# Patient Record
Sex: FEMALE | Race: BLACK OR AFRICAN AMERICAN | Employment: FULL TIME | URBAN - NONMETROPOLITAN AREA
[De-identification: names, ages, dates, MRNs, and addresses within clinical notes are randomized per-mention and may not be internally consistent; named-entity substitution may affect disease eponyms.]

---

## 2019-11-27 LAB — PAP SMEAR, EXTERNAL: NORMAL

## 2020-09-02 RX ORDER — HYOSCYAMINE SULFATE 0.38 MG/1
TABLET, EXTENDED RELEASE ORAL
Qty: 60 TAB | Refills: 0 | Status: SHIPPED | OUTPATIENT
Start: 2020-09-02 | End: 2020-10-02

## 2020-10-02 RX ORDER — HYOSCYAMINE SULFATE 0.38 MG/1
TABLET, EXTENDED RELEASE ORAL
Qty: 60 TAB | Refills: 0 | Status: SHIPPED | OUTPATIENT
Start: 2020-10-02 | End: 2020-11-02

## 2020-11-02 RX ORDER — BUPROPION HYDROCHLORIDE 100 MG/1
TABLET, EXTENDED RELEASE ORAL
Qty: 180 TAB | Refills: 0 | Status: SHIPPED | OUTPATIENT
Start: 2020-11-02 | End: 2021-02-02

## 2020-11-02 RX ORDER — HYOSCYAMINE SULFATE 0.38 MG/1
TABLET, EXTENDED RELEASE ORAL
Qty: 60 TAB | Refills: 0 | Status: SHIPPED | OUTPATIENT
Start: 2020-11-02 | End: 2020-12-03

## 2020-12-03 RX ORDER — HYOSCYAMINE SULFATE 0.38 MG/1
TABLET, EXTENDED RELEASE ORAL
Qty: 60 TAB | Refills: 0 | Status: SHIPPED | OUTPATIENT
Start: 2020-12-03 | End: 2021-01-04

## 2021-01-04 RX ORDER — HYOSCYAMINE SULFATE 0.38 MG/1
TABLET, EXTENDED RELEASE ORAL
Qty: 60 TAB | Refills: 0 | Status: SHIPPED | OUTPATIENT
Start: 2021-01-04 | End: 2021-02-06

## 2021-01-12 ENCOUNTER — OFFICE VISIT (OUTPATIENT)
Dept: FAMILY MEDICINE CLINIC | Age: 31
End: 2021-01-12
Payer: COMMERCIAL

## 2021-01-12 VITALS
OXYGEN SATURATION: 20 % | RESPIRATION RATE: 20 BRPM | TEMPERATURE: 98 F | HEART RATE: 123 BPM | DIASTOLIC BLOOD PRESSURE: 91 MMHG | SYSTOLIC BLOOD PRESSURE: 138 MMHG

## 2021-01-12 DIAGNOSIS — Z20.822 SUSPECTED COVID-19 VIRUS INFECTION: ICD-10-CM

## 2021-01-12 DIAGNOSIS — R43.0 ANOSMIA: ICD-10-CM

## 2021-01-12 DIAGNOSIS — Z20.822 SUSPECTED COVID-19 VIRUS INFECTION: Primary | ICD-10-CM

## 2021-01-12 PROCEDURE — 99213 OFFICE O/P EST LOW 20 MIN: CPT | Performed by: INTERNAL MEDICINE

## 2021-01-12 RX ORDER — NORETHINDRONE ACETATE AND ETHINYL ESTRADIOL AND FERROUS FUMARATE 1MG-20(24)
KIT ORAL
COMMUNITY
Start: 2020-12-17

## 2021-01-12 NOTE — PROGRESS NOTES
Dontrell Travis presents today for   Chief Complaint   Patient presents with    Nasal Congestion     loss of taste and smell    Nasal Congestion     runny nose, sinus congestion       Is someone accompanying this pt? no  Is the patient using any DME equipment during OV? no    Depression Screening:  3 most recent PHQ Screens 1/12/2021   Little interest or pleasure in doing things Not at all   Feeling down, depressed, irritable, or hopeless Not at all   Total Score PHQ 2 0       Learning Assessment:  No flowsheet data found. Health Maintenance reviewed and discussed and ordered per Provider. Health Maintenance Due   Topic Date Due    DTaP/Tdap/Td series (1 - Tdap) 03/19/2011    PAP AKA CERVICAL CYTOLOGY  03/19/2011    Flu Vaccine (1) 09/01/2020   . Coordination of Care:  1. Have you been to the ER, urgent care clinic since your last visit? Hospitalized since your last visit? no    2. Have you seen or consulted any other health care providers outside of the [address] [place] [address] since your last visit? Include any pap smears or colon screening.  no

## 2021-01-12 NOTE — PROGRESS NOTES
This is a very pleasant patient who was seen in clinic today for an acute visit. Assessment/Plan:      1. Suspected COVID-19 virus infection  Based on the loss of smell and taste as well as upper respiratory symptoms I have a strong suspicion she has COVID-19. Will obtain Covid PCR. Patient has been confined to her house for 10+1 days to start tomorrow. Additionally she was advised to seek immediate medical care should she develop any acute chest pain chest pressure or shortness of breath  - NOVEL CORONAVIRUS (COVID-19); Future    2. Anosmia  Due to likely due to COVID-19  - NOVEL CORONAVIRUS (COVID-19); Future       Reviewed with him that COVID-19 pandemic is an evolving situation with rapidly changing recommendations & guidelines. Medical decisions are made based on the the best information available at the time. Recommended he stay tuned for updates published by trusted sources and to advise your PCP of any unexpected changes in clinical condition       Chief Complaint   Patient presents with    Nasal Congestion     loss of taste and smell    Nasal Congestion     runny nose, sinus congestion      Subjective:   HPI     This is a delightful 80-year-old female with no significant past medical history who presents today with 4 days of upper respiratory symptoms including postnasal drip mild headache cough and fatigue. She also acutely lost her sense of smell and taste approximately 48 hours ago. Her mother is currently in the hospital due to COVID-19. She lives with her mother and father. Her father is not showing symptoms. She does not have any chest pain or shortness of breath or cough is nonproductive and actually improved. She is not having any body aches but is admitting to significant fatigue.   She denies any chest pain chest pressure or shortness of breath  Recent Travel Screening and Travel History documentation  Exam      ROS  - GEN: no weight gain/loss, no fevers or chills  - HEENT: no vision changes, no tinnitus, no sore throat  - CV: no cp, palpitations or edema  - RESP: no sob, +cough  - ABD: no n/v/d, no blood in stool  - : no dysuria or changes in freq. - SKIN: no rashes, ulcers  - Neuro: no resting tremors, parasthesia in extremities, no headaches  - MS: No weakness in extremities, no gait abnormalities  - Other    Visit Vitals  BP (!) 138/91   Pulse (!) 123   Temp 98 °F (36.7 °C)   Resp 20   SpO2 (!) 20%        Physical Exam  Constitutional:       Appearance: Normal appearance. Obese. NAD and pleasant  HENT:      Head: Normocephalic. Nose: Nose normal.      Mouth/Throat:      Mouth: Mucous membranes are moist. Throat not inflammed  Eyes:      Extraocular Movements: Extraocular movements intact. Conjunctiva/sclera: Conjunctivae normal. Sclera anicteric     Pupils: Pupils are equal, round, and reactive to light. Cardiovascular:      Rate and Rhythm: tachycardic     Pulses: Normal pulses. Pulmonary:      Effort: No respiratory distress. Breath sounds: CTAB and No stridor. No rhonchi. Abdominal:      General: There is no distension. NT, ND  Neurological:      Mental Status: She is alert and oriented times 3. No resting tremor, normal gait     Cranial Nerves: cranial nerves grossly intact    Current Outpatient Medications on File Prior to Visit   Medication Sig Dispense Refill    Junel Fe 24 1 mg-20 mcg (24)/75 mg (4) tab       hyoscyamine SR (LEVBID) 0.375 mg SR tablet TAKE 1 TABLET BY MOUTH EVERY 12 HOURS 60 Tab 0    buPROPion SR (WELLBUTRIN SR) 100 mg SR tablet Take 1 tablet by mouth twice daily 180 Tab 0     No current facility-administered medications on file prior to visit. Disclaimer:  Discussed expected course/resolution/complications of diagnosis in detail with patient. Medication risks/benefits/costs/interactions/alternatives discussed with patient.   He was given an after visit summary which includes diagnoses, current medications, & vitals. He expressed understanding with the diagnosis and plan.

## 2021-01-14 LAB
SARS-COV-2, NAA: DETECTED
SPECIMEN STATUS REPORT, ROLRST: NORMAL

## 2021-02-02 RX ORDER — BUPROPION HYDROCHLORIDE 100 MG/1
TABLET, EXTENDED RELEASE ORAL
Qty: 180 TAB | Refills: 0 | Status: SHIPPED | OUTPATIENT
Start: 2021-02-02 | End: 2021-05-10

## 2021-02-06 RX ORDER — HYOSCYAMINE SULFATE 0.38 MG/1
TABLET, EXTENDED RELEASE ORAL
Qty: 60 TAB | Refills: 0 | Status: SHIPPED | OUTPATIENT
Start: 2021-02-06 | End: 2021-03-09

## 2021-03-09 RX ORDER — HYOSCYAMINE SULFATE 0.38 MG/1
TABLET, EXTENDED RELEASE ORAL
Qty: 60 TAB | Refills: 0 | Status: SHIPPED | OUTPATIENT
Start: 2021-03-09 | End: 2021-04-13

## 2021-04-13 RX ORDER — HYOSCYAMINE SULFATE 0.38 MG/1
TABLET, EXTENDED RELEASE ORAL
Qty: 60 TAB | Refills: 0 | Status: SHIPPED | OUTPATIENT
Start: 2021-04-13 | End: 2021-05-13

## 2021-05-10 RX ORDER — BUPROPION HYDROCHLORIDE 100 MG/1
TABLET, EXTENDED RELEASE ORAL
Qty: 180 TAB | Refills: 0 | Status: SHIPPED | OUTPATIENT
Start: 2021-05-10 | End: 2021-08-17

## 2021-05-13 RX ORDER — HYOSCYAMINE SULFATE 0.38 MG/1
TABLET, EXTENDED RELEASE ORAL
Qty: 60 TAB | Refills: 0 | Status: SHIPPED | OUTPATIENT
Start: 2021-05-13 | End: 2021-06-14

## 2021-06-14 RX ORDER — HYOSCYAMINE SULFATE 0.38 MG/1
TABLET, EXTENDED RELEASE ORAL
Qty: 60 TABLET | Refills: 0 | Status: SHIPPED | OUTPATIENT
Start: 2021-06-14 | End: 2021-07-16

## 2021-07-16 RX ORDER — HYOSCYAMINE SULFATE 0.38 MG/1
TABLET, EXTENDED RELEASE ORAL
Qty: 60 TABLET | Refills: 0 | Status: SHIPPED | OUTPATIENT
Start: 2021-07-16 | End: 2021-08-21

## 2021-08-17 RX ORDER — BUPROPION HYDROCHLORIDE 100 MG/1
TABLET, EXTENDED RELEASE ORAL
Qty: 180 TABLET | Refills: 0 | Status: SHIPPED | OUTPATIENT
Start: 2021-08-17 | End: 2021-11-23

## 2021-08-21 RX ORDER — HYOSCYAMINE SULFATE 0.38 MG/1
TABLET, EXTENDED RELEASE ORAL
Qty: 60 TABLET | Refills: 0 | Status: SHIPPED | OUTPATIENT
Start: 2021-08-21 | End: 2021-09-22

## 2021-09-22 RX ORDER — HYOSCYAMINE SULFATE 0.38 MG/1
TABLET, EXTENDED RELEASE ORAL
Qty: 60 TABLET | Refills: 0 | Status: SHIPPED | OUTPATIENT
Start: 2021-09-22 | End: 2021-10-29 | Stop reason: SDUPTHER

## 2021-11-01 RX ORDER — HYOSCYAMINE SULFATE 0.38 MG/1
375 TABLET, EXTENDED RELEASE ORAL EVERY 12 HOURS
Qty: 60 TABLET | Refills: 0 | Status: SHIPPED | OUTPATIENT
Start: 2021-11-01 | End: 2021-12-28

## 2021-11-23 ENCOUNTER — OFFICE VISIT (OUTPATIENT)
Dept: FAMILY MEDICINE CLINIC | Age: 31
End: 2021-11-23
Payer: COMMERCIAL

## 2021-11-23 VITALS
WEIGHT: 205 LBS | BODY MASS INDEX: 37.73 KG/M2 | HEIGHT: 62 IN | HEART RATE: 128 BPM | RESPIRATION RATE: 20 BRPM | DIASTOLIC BLOOD PRESSURE: 86 MMHG | OXYGEN SATURATION: 96 % | SYSTOLIC BLOOD PRESSURE: 135 MMHG

## 2021-11-23 DIAGNOSIS — Z13.220 SCREENING CHOLESTEROL LEVEL: ICD-10-CM

## 2021-11-23 DIAGNOSIS — Z13.29 SCREENING FOR THYROID DISORDER: ICD-10-CM

## 2021-11-23 DIAGNOSIS — F98.8 ATTENTION DEFICIT DISORDER, UNSPECIFIED HYPERACTIVITY PRESENCE: Primary | ICD-10-CM

## 2021-11-23 DIAGNOSIS — Z13.0 SCREENING FOR DEFICIENCY ANEMIA: ICD-10-CM

## 2021-11-23 DIAGNOSIS — Z13.6 SCREENING FOR HYPERTENSION: ICD-10-CM

## 2021-11-23 DIAGNOSIS — Z11.59 NEED FOR HEPATITIS C SCREENING TEST: ICD-10-CM

## 2021-11-23 DIAGNOSIS — E55.9 VITAMIN D DEFICIENCY: ICD-10-CM

## 2021-11-23 DIAGNOSIS — Z13.1 SCREENING FOR DIABETES MELLITUS: ICD-10-CM

## 2021-11-23 DIAGNOSIS — R00.0 TACHYCARDIA: ICD-10-CM

## 2021-11-23 PROCEDURE — 99214 OFFICE O/P EST MOD 30 MIN: CPT | Performed by: NURSE PRACTITIONER

## 2021-11-23 RX ORDER — ATOMOXETINE 40 MG/1
1 CAPSULE ORAL DAILY
COMMUNITY
Start: 2021-10-05

## 2021-11-23 RX ORDER — BUPROPION HYDROCHLORIDE 150 MG/1
150 TABLET, EXTENDED RELEASE ORAL 2 TIMES DAILY
COMMUNITY

## 2021-11-23 NOTE — PROGRESS NOTES
History of Present Illness  Alan Macias is a 32 y.o. female who presents today for:    Past Medical History  No past medical history on file. Surgical History  No past surgical history on file. Current Medications  Current Outpatient Medications   Medication Sig    atomoxetine (STRATTERA) 40 mg capsule Take 1 Capsule by mouth daily.  buPROPion SR (WELLBUTRIN SR) 150 mg SR tablet Take 150 mg by mouth two (2) times a day.  hyoscyamine SR (LEVBID) 0.375 mg SR tablet Take 1 Tablet by mouth every twelve (12) hours.  Junel Fe 24 1 mg-20 mcg (24)/75 mg (4) tab      No current facility-administered medications for this visit. Allergies/Drug Reactions  No Known Allergies     Family History  No family history on file. Social History  Social History     Tobacco Use    Smoking status: Never Smoker    Smokeless tobacco: Never Used   Substance Use Topics    Alcohol use: Not on file    Drug use: Not on file        Health Maintenance   Topic Date Due    Hepatitis C Screening  Never done    DTaP/Tdap/Td series (1 - Tdap) Never done    Cervical cancer screen  Never done    COVID-19 Vaccine (2 - Moderna 2-dose series) 06/23/2021    Flu Vaccine  Completed    Pneumococcal 0-64 years  Aged Out     Review of Systems  Review of Systems   Constitutional: Negative. HENT: Negative. Eyes: Negative. Respiratory: Negative. Cardiovascular: Negative. Gastrointestinal: Negative. Genitourinary: Negative. Musculoskeletal: Negative. Skin: Negative. Neurological: Negative. Endo/Heme/Allergies: Positive for environmental allergies. Negative for polydipsia. Does not bruise/bleed easily. Patient reports history of seasonal allergies. Psychiatric/Behavioral: Positive for depression. Negative for hallucinations, memory loss, substance abuse and suicidal ideas. The patient is not nervous/anxious and does not have insomnia.          Patient reports history of depression which is well managed with prescribed Wellbutrin. Physical Exam  Vital signs:   Vitals:    11/23/21 1534 11/23/21 1536 11/23/21 1613   BP: (!) 153/92 (!) 150/92 135/86   Pulse: (!) 128     Resp: 20     SpO2: 96%     Weight: 205 lb (93 kg)     Height: 5' 2\" (1.575 m)       General: alert, oriented, not in distress  Head: scalp normal, atraumatic  Eyes: pupils are equal and reactive, full and intact EOM's  Ears: patent ear canal, intact tympanic membrane  Nose: normal turbinates, no congestion or discharge  Lips/Mouth: moist lips and buccal mucosa, non-enlarged tonsils, pink throat  Neck: supple, no JVD, no lymphadenopathy, non-palpable thyroid  Chest/Lungs: clear breath sounds, no wheezing or crackles  Heart: normal rate, regular rhythm, no murmur  Abdomen: soft, non-distended, non-tender, normal bowel sounds, no organomegaly, no masses  Extremities: no focal deformities, no edema  Skin: no active skin lesions    Laboratory/Tests:  No visits with results within 3 Month(s) from this visit. Latest known visit with results is:   Office Visit on 01/12/2021   Component Date Value Ref Range Status    SARS-CoV-2, ALYSSA 01/12/2021 Detected* Not Detected Final    Comment: This nucleic acid amplification test was developed and its performance  characteristics determined by [employer]. Nucleic acid  amplification tests include PCR and TMA. This test has not been FDA  cleared or approved. This test has been authorized by FDA under an  Emergency Use Authorization (EUA). This test is only authorized for  the duration of time the declaration that circumstances exist  justifying the authorization of the emergency use of in vitro  diagnostic tests for detection of SARS-CoV-2 virus and/or diagnosis  of COVID-19 infection under section 564(b)(1) of the Act, 21 U. S.C.  872YCJ-6(D) (1), unless the authorization is terminated or revoked  sooner.   When diagnostic testing is negative, the possibility of a false  negative result should be considered in the context of a patient's  recent exposures and the presence of clinical signs and symptoms  consistent with COVID-19. An individual without symptoms of COVID-19  and who is not shedding SARS-CoV-2 virus would                            expect to have a  negative (not detected) result in this assay.  SPECIMEN STATUS REPORT 01/12/2021 COMMENT   Final    Comment: Please note  Please note  The date and/or time of collection was not indicated on the  requisition as required by state and federal law. The date of  receipt of the specimen was used as the collection date if not  supplied. Patient reports normal resting heart rate averages 80s, but is elevated at this visit. Patient reports she is prescribed Straterra and Wellbutrin by another provider. Patient reports she was previously prescribed hyoscyamine by another provider for management of irritable bowel syndrome. She reports at 1 point she was out of her medication for approximately 2 weeks and noticed no difference in her IBS. She reports she would like to have a trial 30-day discontinuation of use of prescribed Hyoscyamine to assess if there is further need for medication to manage her manage IBS. Physical examination performed:  Bilateral ear tympanic membrane visualized during otoscopic examination revealed no abnormalities. Bilateral lung sounds clear to auscultation in all fields. Cardiac auscultation revealed no murmurs or arrhythmias, but there is slight tachycardia patient's resting heart rate during this visit was in 120s. Abdomen soft and nontender, bowel sounds normoactive in all 4 quadrants. There is no observed bilateral lower extremity edema. Assessment/Plan:    1. Attention deficit disorder. Continue Atomoxetine 40 mg capsule daily for management of attention deficit disorder. 2.  Depression. Continue Wellbutrin 150 mg tablet twice daily for management of depression.     I have discussed the diagnosis with the patient and the intended plan as seen in the above orders. The patient has received an after-visit summary and questions were answered concerning future plans. I have discussed medication side effects and warnings with the patient as well. I have reviewed the plan of care with the patient, accepted their input and they are in agreement with the treatment goals.        Teressa Chen NP  November 23, 2021

## 2021-12-28 ENCOUNTER — OFFICE VISIT (OUTPATIENT)
Dept: FAMILY MEDICINE CLINIC | Age: 31
End: 2021-12-28
Payer: COMMERCIAL

## 2021-12-28 VITALS
RESPIRATION RATE: 18 BRPM | HEART RATE: 121 BPM | HEIGHT: 62 IN | WEIGHT: 205 LBS | SYSTOLIC BLOOD PRESSURE: 138 MMHG | TEMPERATURE: 98.7 F | OXYGEN SATURATION: 98 % | BODY MASS INDEX: 37.73 KG/M2 | DIASTOLIC BLOOD PRESSURE: 87 MMHG

## 2021-12-28 DIAGNOSIS — I49.9 IRREGULAR HEART RATE: ICD-10-CM

## 2021-12-28 DIAGNOSIS — R00.0 SINUS TACHYCARDIA: Primary | ICD-10-CM

## 2021-12-28 PROCEDURE — 99213 OFFICE O/P EST LOW 20 MIN: CPT | Performed by: NURSE PRACTITIONER

## 2021-12-28 PROCEDURE — 93000 ELECTROCARDIOGRAM COMPLETE: CPT | Performed by: NURSE PRACTITIONER

## 2021-12-28 RX ORDER — METOPROLOL SUCCINATE 25 MG/1
25 TABLET, EXTENDED RELEASE ORAL DAILY
Qty: 90 TABLET | Refills: 1 | Status: SHIPPED | OUTPATIENT
Start: 2021-12-28 | End: 2022-08-22

## 2021-12-28 NOTE — PROGRESS NOTES
Depression Screening:  3 most recent PHQ Screens 12/28/2021   Little interest or pleasure in doing things Not at all   Feeling down, depressed, irritable, or hopeless Not at all   Total Score PHQ 2 0       Learning Assessment:  Learning Assessment 11/23/2021   PRIMARY LEARNER Patient   HIGHEST LEVEL OF EDUCATION - PRIMARY LEARNER  > 4 YEARS OF COLLEGE   BARRIERS PRIMARY LEARNER NONE   CO-LEARNER CAREGIVER No   PRIMARY LANGUAGE ENGLISH   LEARNER PREFERENCE PRIMARY VIDEOS   ANSWERED BY patient   RELATIONSHIP SELF       Abuse Screening:  Abuse Screening Questionnaire 11/23/2021   Do you ever feel afraid of your partner? N   Are you in a relationship with someone who physically or mentally threatens you? N   Is it safe for you to go home? Y       Fall Risk  Fall Risk Assessment, last 12 mths 1/12/2021   Able to walk? Yes   Fall in past 12 months? 0   Do you feel unsteady? 0   Are you worried about falling 0       ADL  ADL Assessment 11/23/2021   Feeding yourself No Help Needed   Getting from bed to chair No Help Needed   Getting dressed No Help Needed   Bathing or showering No Help Needed   Walk across the room (includes cane/walker) No Help Needed   Using the telphone No Help Needed   Taking your medications No Help Needed   Preparing meals No Help Needed   Managing money (expenses/bills) No Help Needed   Moderately strenuous housework (laundry) No Help Needed   Shopping for personal items (toiletries/medicines) No Help Needed   Shopping for groceries No Help Needed   Driving No Help Needed   Climbing a flight of stairs No Help Needed   Getting to places beyond walking distances No Help Needed       Travel Screening:    Travel Screening     Question   Response    In the last month, have you been in contact with someone who was confirmed or suspected to have Coronavirus / COVID-19? No / Unsure    Have you had a COVID-19 viral test in the last 14 days? No    Do you have any of the following new or worsening symptoms? None of these    Have you traveled internationally or domestically in the last month? No      Travel History   Travel since 11/28/21    No documented travel since 11/28/21         Health Maintenance reviewed and discussed and ordered per Provider. Health Maintenance Due   Topic Date Due    Hepatitis C Screening  Never done    DTaP/Tdap/Td series (1 - Tdap) Never done    Cervical cancer screen  Never done    COVID-19 Vaccine (3 - Booster for Moderna series) 12/23/2021   . Sveta Cosme presents today for   Chief Complaint   Patient presents with    Follow-up     pt forgot to get labs done       Is someone accompanying this pt? no    Is the patient using any DME equipment during OV? no    Coordination of Care:  1. Have you been to the ER, urgent care clinic since your last visit? Hospitalized since your last visit? no    2. Have you seen or consulted any other health care providers outside of the [address] [place] [address] since your last visit? Include any pap smears or colon screening.  no

## 2021-12-28 NOTE — PROGRESS NOTES
History of Present Illness  Abdiel Persaud is a 32 y.o. female who presents today for:    Chief Complaint   Patient presents with    Follow-up     pt forgot to get labs done       Past Medical History  History reviewed. No pertinent past medical history. Surgical History  History reviewed. No pertinent surgical history. Current Medications  Current Outpatient Medications   Medication Sig    atomoxetine (STRATTERA) 40 mg capsule Take 1 Capsule by mouth daily.  buPROPion SR (WELLBUTRIN SR) 150 mg SR tablet Take 150 mg by mouth two (2) times a day.  hyoscyamine SR (LEVBID) 0.375 mg SR tablet Take 1 Tablet by mouth every twelve (12) hours.  Junel Fe 24 1 mg-20 mcg (24)/75 mg (4) tab      No current facility-administered medications for this visit. Allergies/Drug Reactions  No Known Allergies     Family History  History reviewed. No pertinent family history.      Social History  Social History     Tobacco Use    Smoking status: Never Smoker    Smokeless tobacco: Never Used   Vaping Use    Vaping Use: Never used   Substance Use Topics    Alcohol use: Not on file    Drug use: Not on file        Health Maintenance   Topic Date Due    Hepatitis C Screening  Never done    DTaP/Tdap/Td series (1 - Tdap) Never done    Cervical cancer screen  Never done    COVID-19 Vaccine (3 - Booster for Moderna series) 12/23/2021    Flu Vaccine  Completed    Pneumococcal 0-64 years  Aged [employer] History   Administered Date(s) Administered    COVID-19, Shannon Vicente, Primary or Immunocompromised Series, MRNA, PF, 100mcg/0.5mL 03/22/2021, 04/05/2021, 04/12/2021, 05/04/2021, 05/10/2021, 05/26/2021, 06/23/2021    Influenza Vaccine 10/13/2021       Physical Exam  Vital signs:   Vitals:    12/28/21 1629   BP: 138/87   Pulse: (!) 121   Resp: 18   Temp: 98.7 °F (37.1 °C)   TempSrc: Oral   SpO2: 98%   Weight: 205 lb (93 kg)   Height: 5' 2\" (1.575 m)     General: alert, oriented, not in distress  Head: scalp normal, atraumatic  Eyes: pupils are equal and reactive, full and intact EOM's  Ears: patent ear canal, intact tympanic membrane  Nose: normal turbinates, no congestion or discharge  Lips/Mouth: moist lips and buccal mucosa, non-enlarged tonsils, pink throat  Neck: supple, no JVD, no lymphadenopathy, non-palpable thyroid  Chest/Lungs: clear breath sounds, no wheezing or crackles  Heart: normal rate, regular rhythm, no murmur  Abdomen: soft, non-distended, non-tender, normal bowel sounds, no organomegaly, no masses  Extremities: no focal deformities, no edema  Skin: no active skin lesions    Laboratory/Tests:  No visits with results within 3 Month(s) from this visit. Latest known visit with results is:   Office Visit on 01/12/2021   Component Date Value Ref Range Status    SARS-CoV-2, ALYSSA 01/12/2021 Detected* Not Detected Final    Comment: This nucleic acid amplification test was developed and its performance  characteristics determined by [employer]. Nucleic acid  amplification tests include PCR and TMA. This test has not been FDA  cleared or approved. This test has been authorized by FDA under an  Emergency Use Authorization (EUA). This test is only authorized for  the duration of time the declaration that circumstances exist  justifying the authorization of the emergency use of in vitro  diagnostic tests for detection of SARS-CoV-2 virus and/or diagnosis  of COVID-19 infection under section 564(b)(1) of the Act, 21 U. S.C.  666LGQ-0(T) (1), unless the authorization is terminated or revoked  sooner. When diagnostic testing is negative, the possibility of a false  negative result should be considered in the context of a patient's  recent exposures and the presence of clinical signs and symptoms  consistent with COVID-19. An individual without symptoms of COVID-19  and who is not shedding SARS-CoV-2 virus would                            expect to have a  negative (not detected) result in this assay.  SPECIMEN STATUS REPORT 01/12/2021 COMMENT   Final    Comment: Please note  Please note  The date and/or time of collection was not indicated on the  requisition as required by state and federal law. The date of  receipt of the specimen was used as the collection date if not  supplied. Patient reports her home blood pressure results were highest 130s/80s. POC EKG=Sinus tachycardia on 12/28/2021. Patient's heart rate usually runs in 120s resting and she reports it can be as high as 150s when engaged in activity. Patient denies chest pain with activity or shortness of breath with increased activity. Patient will be prescribed Metoprolol at this visit. Patient reports she has discontinued use of prescribed Hysocyamine. She reports she feels better and has no symptoms IBS diarrhea. Physical examination performed:  Bilateral lung sounds clear to auscultation in all fields. Cardiac auscultation revealed no arrhythmias or murmurs. Abdomen soft and nontender, bowel sounds normoactive in all 4 quadrants. There is no observed bilateral lower extremity edema. Assessment/Plan:    1. Sinus tachycardia. Prescribed Metoprolol 25 mg XL tablet daily for management of sinus tachycardia. I have discussed the diagnosis with the patient and the intended plan as seen in the above orders. The patient has received an after-visit summary and questions were answered concerning future plans. I have discussed medication side effects and warnings with the patient as well. I have reviewed the plan of care with the patient, accepted their input and they are in agreement with the treatment goals.        Lis Wagner NP  December 28, 2021

## 2022-02-11 ENCOUNTER — HOSPITAL ENCOUNTER (OUTPATIENT)
Dept: LAB | Age: 32
Discharge: HOME | End: 2022-02-11

## 2022-02-11 PROCEDURE — 99001 SPECIMEN HANDLING PT-LAB: CPT

## 2022-02-24 ENCOUNTER — VIRTUAL VISIT (OUTPATIENT)
Dept: FAMILY MEDICINE CLINIC | Age: 32
End: 2022-02-24
Payer: COMMERCIAL

## 2022-02-24 DIAGNOSIS — E55.9 VITAMIN D DEFICIENCY: Primary | ICD-10-CM

## 2022-02-24 PROCEDURE — 99442 PR PHYS/QHP TELEPHONE EVALUATION 11-20 MIN: CPT | Performed by: STUDENT IN AN ORGANIZED HEALTH CARE EDUCATION/TRAINING PROGRAM

## 2022-02-24 NOTE — PROGRESS NOTES
Cecelia Bos presents today for   Chief Complaint   Patient presents with    Follow-up         Depression Screening:  3 most recent PHQ Screens 12/28/2021   Little interest or pleasure in doing things Not at all   Feeling down, depressed, irritable, or hopeless Not at all   Total Score PHQ 2 0       Learning Assessment:  Learning Assessment 11/23/2021   PRIMARY LEARNER Patient   HIGHEST LEVEL OF EDUCATION - PRIMARY LEARNER  > 4 YEARS OF COLLEGE   BARRIERS PRIMARY LEARNER NONE   CO-LEARNER CAREGIVER No   PRIMARY LANGUAGE ENGLISH   LEARNER PREFERENCE PRIMARY VIDEOS   ANSWERED BY patient   RELATIONSHIP SELF       Fall Risk  Fall Risk Assessment, last 12 mths 1/12/2021   Able to walk? Yes   Fall in past 12 months? 0   Do you feel unsteady? 0   Are you worried about falling 0       Health Maintenance reviewed and discussed and ordered per Provider. Health Maintenance Due   Topic Date Due    Hepatitis C Screening  Never done    DTaP/Tdap/Td series (1 - Tdap) Never done    Cervical cancer screen  Never done    COVID-19 Vaccine (3 - Booster for Moderna series) 11/23/2021   . Coordination of Care:  1. Have you been to the ER, urgent care clinic since your last visit? Hospitalized since your last visit? No     2. Have you seen or consulted any other health care providers outside of the [address] [place] [address] since your last visit? Include any pap smears or colon screening.  No

## 2022-02-24 NOTE — PROGRESS NOTES
Harsh Garza is a 32 y.o. female who was seen by synchronous (real-time) audio technology on 2/24/2022. Consent: Harsh Garza, who was seen by synchronous (real-time) audio technology, and/or her healthcare decision maker, is aware that this patient-initiated, Telehealth encounter on 2/24/2022 is a billable service, with coverage as determined by her insurance carrier. She is aware that she may receive a bill and has provided verbal consent to proceed: Yes. Subjective:   Harsh Garza is a 32 y.o. female who was seen for Follow-up    Patient here for follow-up. She wants to go over lab results. Only abnormal is that she has a mild vitamin D deficiency. Not currently taking any supplements    Home Medications    Medication Sig Start Date End Date Taking? Authorizing Provider   metoprolol succinate (TOPROL-XL) 25 mg XL tablet Take 1 Tablet by mouth daily. 12/28/21   Henrik SALDIVAR NP   atomoxetine (STRATTERA) 40 mg capsule Take 1 Capsule by mouth daily. 10/5/21   Provider, Historical   buPROPion SR (WELLBUTRIN SR) 150 mg SR tablet Take 150 mg by mouth two (2) times a day. Provider, Historical   Junel Fe 24 1 mg-20 mcg (24)/75 mg (4) tab  12/17/20   Provider, Historical      No Known Allergies  Social History     Tobacco Use    Smoking status: Never Smoker    Smokeless tobacco: Never Used   Vaping Use    Vaping Use: Never used   Substance Use Topics    Alcohol use: Not on file    Drug use: Not on file        Review of systems:  All other systems are negative          Objective: There were no vitals taken for this visit. General: alert, cooperative, no distress   Mental  status: normal mood, behavior, speech, thought processes, able to follow commands   Psychiatric: normal affect, consistent with stated mood, no evidence of hallucinations       Assessment & Plan:     1. Vitamin D deficiency  Advised patient to  vitamin D over-the-counter supplements.          Time spent on encounter: 15 minutes  712      We discussed the expected course, resolution and complications of the diagnosis(es) in detail. Medication risks, benefits, costs, interactions, and alternatives were discussed as indicated. I advised her to contact the office if her condition worsens, changes or fails to improve as anticipated. She expressed understanding with the diagnosis(es) and plan. Iris Vargas is a 32 y.o. female who was evaluated by an audio visit encounter for concerns as above. Patient identification was verified prior to start of the visit. A caregiver was present when appropriate. Due to this being a TeleHealth encounter (During AOJ-21 public health emergency), evaluation of the following organ systems was limited: Vitals/Constitutional/EENT/Resp/CV/GI//MS/Neuro/Skin/Heme-Lymph-Imm. Pursuant to the emergency declaration under the [facility], 1135 waiver authority and the [employer] and [employer] Act, this Virtual  Visit was conducted, with patient's (and/or legal guardian's) consent, to reduce the patient's risk of exposure to COVID-19 and provide necessary medical care. Iris Vargas is a 32 y.o. female who was seen by synchronous (real-time) audio technology on 2/24/2022. Consent: Iris Vargas, who was seen by synchronous (real-time) audio technology, and/or her healthcare decision maker, is aware that this patient-initiated, Telehealth encounter on 2/24/2022 is a billable service, with coverage as determined by her insurance carrier. She is aware that she may receive a bill and has provided verbal consent to proceed: Yes. Ability to conduct physical exam was limited. I was in the office. The patient was at home.

## 2022-03-28 DIAGNOSIS — J30.9 ALLERGIC RHINITIS, UNSPECIFIED SEASONALITY, UNSPECIFIED TRIGGER: Primary | ICD-10-CM

## 2022-03-28 RX ORDER — CETIRIZINE HCL 10 MG
10 TABLET ORAL
Qty: 30 TABLET | Refills: 1 | Status: SHIPPED | OUTPATIENT
Start: 2022-03-28 | End: 2022-06-06

## 2022-10-10 ENCOUNTER — VIRTUAL VISIT (OUTPATIENT)
Dept: FAMILY MEDICINE CLINIC | Age: 32
End: 2022-10-10
Payer: COMMERCIAL

## 2022-10-10 DIAGNOSIS — J01.90 ACUTE NON-RECURRENT SINUSITIS, UNSPECIFIED LOCATION: Primary | ICD-10-CM

## 2022-10-10 PROCEDURE — 99441 PR PHYS/QHP TELEPHONE EVALUATION 5-10 MIN: CPT | Performed by: NURSE PRACTITIONER

## 2022-10-10 RX ORDER — AMOXICILLIN AND CLAVULANATE POTASSIUM 875; 125 MG/1; MG/1
1 TABLET, FILM COATED ORAL EVERY 12 HOURS
Qty: 20 TABLET | Refills: 0 | Status: SHIPPED | OUTPATIENT
Start: 2022-10-10 | End: 2022-10-20

## 2022-10-10 NOTE — PROGRESS NOTES
Reji Carrasco is a 28 y.o. female, evaluated via audio-only technology on 10/10/2022 for Sinus Infection (Pt reports sx started sept 20 , 2022. Pt reports mucus dark green/brown. Pt  reports she is a non smoker. Pt reports having headaches and joint pain and chills x 3 days. PT reports no testing for COVID at this time but has several sx at this time. )  . Assessment & Plan:   1. Acute non-recurrent sinusitis, unspecified location  -also recommended to get Covid tested  - amoxicillin-clavulanate (AUGMENTIN) 875-125 mg per tablet; Take 1 Tablet by mouth every twelve (12) hours for 10 days. Indications: a common cold  Dispense: 20 Tablet; Refill: 0     12  Subjective:    Pt reports sx started sept 20 , 2022. Pt  reports she is a non smoker. Pt reports having headaches and joint pain and chills x 3 days. PT reports no testing for COVID at this time but has several sx at this time. Prior to Admission medications    Medication Sig Start Date End Date Taking? Authorizing Provider   cetirizine (ZYRTEC) 10 mg tablet Take 1 tablet by mouth nightly 9/26/22  Yes Sarabjit SALDIVAR, NP   metoprolol succinate (TOPROL-XL) 25 mg XL tablet Take 1 tablet by mouth once daily 8/22/22  Yes Shadia Simpson, NP   atomoxetine (STRATTERA) 40 mg capsule Take 1 Capsule by mouth daily. 10/5/21  Yes Provider, Historical   buPROPion SR (WELLBUTRIN SR) 150 mg SR tablet Take 150 mg by mouth two (2) times a day. Yes Provider, Historical   Junel Fe 24 1 mg-20 mcg (24)/75 mg (4) tab  12/17/20  Yes Provider, Historical         Review of Systems   Constitutional:  Positive for malaise/fatigue. Negative for fever. HENT:  Positive for congestion, ear pain and sinus pain. Respiratory:  Positive for cough and sputum production. Patient-Reported LMP: birth control. Reji Carrasco was evaluated through a patient-initiated, synchronous (real-time) audio only encounter.  She (or guardian if applicable) is aware that it is a billable service, which includes applicable co-pays, with coverage as determined by her insurance carrier. This visit was conducted with the patient's (and/or Dung Whaley guardian's) verbal consent. She has not had a related appointment within my department in the past 7 days or scheduled within the next 24 hours. The patient was located in a state where the provider was licensed to provide care. The patient was located at: Home: [address] [place] [id number]  The provider was located at:  Facility (Appt Department): [facility]  [phone]    Total Time: minutes: 5-10 minutes    [address] [place] [address], NP-C

## 2022-10-10 NOTE — PROGRESS NOTES
Jolene Painter presents today for   Chief Complaint   Patient presents with    Sinus Infection     Pt reports sx started sept 20 , 2022. Pt reports mucus dark green/brown. Pt  reports she is a non smoker. Pt reports having headaches and joint pain and chills x 3 days. PT reports no testing for COVID at this time but has several sx at this time. Depression Screening:  3 most recent PHQ Screens 12/28/2021   Little interest or pleasure in doing things Not at all   Feeling down, depressed, irritable, or hopeless Not at all   Total Score PHQ 2 0       Learning Assessment:  Learning Assessment 11/23/2021   PRIMARY LEARNER Patient   HIGHEST LEVEL OF EDUCATION - PRIMARY LEARNER  > 4 YEARS OF COLLEGE   BARRIERS PRIMARY LEARNER NONE   CO-LEARNER CAREGIVER No   PRIMARY LANGUAGE ENGLISH   LEARNER PREFERENCE PRIMARY VIDEOS   ANSWERED BY patient   RELATIONSHIP SELF       Health Maintenance reviewed and discussed and ordered per Provider. Health Maintenance Due   Topic Date Due    Hepatitis C Screening  Never done    DTaP/Tdap/Td series (1 - Tdap) Never done    Cervical cancer screen  Never done    Flu Vaccine (1) 08/01/2022   . Coordination of Care:  1. \"Have you been to the ER, urgent care clinic since your last visit? Hospitalized since your last visit? \" No    2. \"Have you seen or consulted any other health care providers outside of the [address] [place] [address] since your last visit? \" No     3. For patients aged 39-70: Has the patient had a colonoscopy? No     If the patient is female:    4. For patients aged 41-77: Has the patient had a mammogram within the past 2 years? No    5. For patients aged 21-65: Has the patient had a pap smear?  No

## 2023-02-24 RX ORDER — METOPROLOL SUCCINATE 25 MG/1
TABLET, EXTENDED RELEASE ORAL
Qty: 90 TABLET | Refills: 0 | OUTPATIENT
Start: 2023-02-24

## 2023-02-27 RX ORDER — METOPROLOL SUCCINATE 25 MG/1
TABLET, EXTENDED RELEASE ORAL
Qty: 90 TABLET | Refills: 0 | OUTPATIENT
Start: 2023-02-27

## 2023-02-28 RX ORDER — METOPROLOL SUCCINATE 25 MG/1
TABLET, EXTENDED RELEASE ORAL
Qty: 90 TABLET | Refills: 0 | OUTPATIENT
Start: 2023-02-28

## 2023-03-01 RX ORDER — METOPROLOL SUCCINATE 25 MG/1
TABLET, EXTENDED RELEASE ORAL
Qty: 90 TABLET | Refills: 0 | OUTPATIENT
Start: 2023-03-01

## 2023-03-06 RX ORDER — METOPROLOL SUCCINATE 25 MG/1
TABLET, EXTENDED RELEASE ORAL
Qty: 45 TABLET | Refills: 0 | Status: SHIPPED | OUTPATIENT
Start: 2023-03-06

## 2023-03-06 NOTE — TELEPHONE ENCOUNTER
Patient has an appointment scheduled with you on 04/19. Missed appointment scheduled with Francisco J in February and needs metoprolol refilled.

## 2023-04-10 RX ORDER — BUPROPION HYDROCHLORIDE 300 MG/1
300 TABLET ORAL EVERY MORNING
COMMUNITY
Start: 2023-01-14

## 2023-04-19 ENCOUNTER — OFFICE VISIT (OUTPATIENT)
Facility: CLINIC | Age: 33
End: 2023-04-19
Payer: COMMERCIAL

## 2023-04-19 VITALS
BODY MASS INDEX: 37.25 KG/M2 | HEIGHT: 62 IN | DIASTOLIC BLOOD PRESSURE: 73 MMHG | TEMPERATURE: 98 F | HEART RATE: 94 BPM | SYSTOLIC BLOOD PRESSURE: 122 MMHG | RESPIRATION RATE: 18 BRPM | WEIGHT: 202.4 LBS | OXYGEN SATURATION: 100 %

## 2023-04-19 DIAGNOSIS — N94.6 DYSMENORRHEA: ICD-10-CM

## 2023-04-19 DIAGNOSIS — E66.09 CLASS 2 OBESITY DUE TO EXCESS CALORIES WITHOUT SERIOUS COMORBIDITY WITH BODY MASS INDEX (BMI) OF 37.0 TO 37.9 IN ADULT: ICD-10-CM

## 2023-04-19 DIAGNOSIS — R00.0 SINUS TACHYCARDIA: Primary | ICD-10-CM

## 2023-04-19 DIAGNOSIS — F90.9 ATTENTION DEFICIT HYPERACTIVITY DISORDER (ADHD), UNSPECIFIED ADHD TYPE: ICD-10-CM

## 2023-04-19 DIAGNOSIS — F33.40 RECURRENT MAJOR DEPRESSIVE DISORDER, IN REMISSION (HCC): ICD-10-CM

## 2023-04-19 PROBLEM — I10 PRIMARY HYPERTENSION: Status: RESOLVED | Noted: 2023-04-19 | Resolved: 2023-04-19

## 2023-04-19 PROBLEM — I10 PRIMARY HYPERTENSION: Status: ACTIVE | Noted: 2023-04-19

## 2023-04-19 PROBLEM — E66.812 CLASS 2 OBESITY DUE TO EXCESS CALORIES WITHOUT SERIOUS COMORBIDITY WITH BODY MASS INDEX (BMI) OF 37.0 TO 37.9 IN ADULT: Status: ACTIVE | Noted: 2023-04-19

## 2023-04-19 PROBLEM — E55.9 VITAMIN D DEFICIENCY: Status: ACTIVE | Noted: 2023-04-19

## 2023-04-19 PROCEDURE — 99214 OFFICE O/P EST MOD 30 MIN: CPT | Performed by: STUDENT IN AN ORGANIZED HEALTH CARE EDUCATION/TRAINING PROGRAM

## 2023-04-19 RX ORDER — METOPROLOL SUCCINATE 25 MG/1
TABLET, EXTENDED RELEASE ORAL
Qty: 90 TABLET | Refills: 1 | Status: SHIPPED | OUTPATIENT
Start: 2023-04-19

## 2023-04-19 SDOH — ECONOMIC STABILITY: HOUSING INSECURITY
IN THE LAST 12 MONTHS, WAS THERE A TIME WHEN YOU DID NOT HAVE A STEADY PLACE TO SLEEP OR SLEPT IN A SHELTER (INCLUDING NOW)?: NO

## 2023-04-19 SDOH — ECONOMIC STABILITY: INCOME INSECURITY: HOW HARD IS IT FOR YOU TO PAY FOR THE VERY BASICS LIKE FOOD, HOUSING, MEDICAL CARE, AND HEATING?: NOT HARD AT ALL

## 2023-04-19 SDOH — HEALTH STABILITY: PHYSICAL HEALTH: ON AVERAGE, HOW MANY DAYS PER WEEK DO YOU ENGAGE IN MODERATE TO STRENUOUS EXERCISE (LIKE A BRISK WALK)?: 4 DAYS

## 2023-04-19 SDOH — ECONOMIC STABILITY: FOOD INSECURITY: WITHIN THE PAST 12 MONTHS, THE FOOD YOU BOUGHT JUST DIDN'T LAST AND YOU DIDN'T HAVE MONEY TO GET MORE.: NEVER TRUE

## 2023-04-19 SDOH — HEALTH STABILITY: PHYSICAL HEALTH: ON AVERAGE, HOW MANY MINUTES DO YOU ENGAGE IN EXERCISE AT THIS LEVEL?: 10 MIN

## 2023-04-19 SDOH — ECONOMIC STABILITY: FOOD INSECURITY: WITHIN THE PAST 12 MONTHS, YOU WORRIED THAT YOUR FOOD WOULD RUN OUT BEFORE YOU GOT MONEY TO BUY MORE.: NEVER TRUE

## 2023-04-19 ASSESSMENT — PATIENT HEALTH QUESTIONNAIRE - PHQ9
SUM OF ALL RESPONSES TO PHQ QUESTIONS 1-9: 0
SUM OF ALL RESPONSES TO PHQ9 QUESTIONS 1 & 2: 0
SUM OF ALL RESPONSES TO PHQ QUESTIONS 1-9: 0
1. LITTLE INTEREST OR PLEASURE IN DOING THINGS: 0
2. FEELING DOWN, DEPRESSED OR HOPELESS: 0
SUM OF ALL RESPONSES TO PHQ QUESTIONS 1-9: 0
SUM OF ALL RESPONSES TO PHQ QUESTIONS 1-9: 0

## 2023-04-19 NOTE — PROGRESS NOTES
Ventrua Louie presents today for   Chief Complaint   Patient presents with    New Patient     Here to establish care - former Francisco J patient       Is someone accompanying this pt? no    Is the patient using any DME equipment during OV? no    Health Maintenance reviewed and discussed and ordered per Provider. Health Maintenance Due   Topic Date Due    Varicella vaccine (1 of 2 - 2-dose childhood series) Never done    HIV screen  Never done    Hepatitis C screen  Never done    DTaP/Tdap/Td vaccine (1 - Tdap) Never done    Cervical cancer screen  Never done    COVID-19 Vaccine (5 - Booster) 08/18/2021    Depression Screen  12/28/2022   . Coordination of Care:  1. \"Have you been to the ER, urgent care clinic since your last visit? Hospitalized since your last visit? \" No    2. \"Have you seen or consulted any other health care providers outside of the [address] [place] [address] since your last visit? \" No    3. For patients aged 39-70: Has the patient had a colonoscopy? N/A based on age/sex    If the patient is female:    4. For patients aged 41-77: Has the patient had a mammogram within the past 2 years? N/A based on age/sex    5. For patients aged 21-65: Has the patient had a pap smear?  Yes- Rooming LPN/MA will get records
18   Temp: 98 °F (36.7 °C)   TempSrc: Temporal   SpO2: 100%   Weight: 202 lb 6.4 oz (91.8 kg)   Height: 5' 2\" (1.575 m)      Body mass index is 37.02 kg/m². Gen: NAD, well appearing   Heart: RRR, no m/g/r  Lungs: CTA bilaterally, no wheezing, breathing comfortably  Abd: Soft, non tender to palpation  Ext: No swelling  Psych: cooperative. Appropriate mood and affect. I reviewed prior available labs. Medical decision making complexity: moderate    I have discussed the diagnosis with the patient and the intended plan as seen in the above orders. The patient has received an after-visit summary and questions were answered concerning future plans. I have discussed medication side effects and warnings with the patient as well. I have reviewed the plan of care with the patient, accepted their input and they are in agreement with the treatment goals. Previous lab and imaging results were reviewed by me.      Bettina Jensen MD   Family Medicine

## 2023-06-02 RX ORDER — CETIRIZINE HYDROCHLORIDE 10 MG/1
TABLET, FILM COATED ORAL
Qty: 90 TABLET | Refills: 1 | Status: SHIPPED | OUTPATIENT
Start: 2023-06-02

## 2023-08-29 ENCOUNTER — OFFICE VISIT (OUTPATIENT)
Facility: CLINIC | Age: 33
End: 2023-08-29
Payer: COMMERCIAL

## 2023-08-29 VITALS
HEIGHT: 62 IN | RESPIRATION RATE: 18 BRPM | TEMPERATURE: 97.8 F | SYSTOLIC BLOOD PRESSURE: 125 MMHG | OXYGEN SATURATION: 99 % | BODY MASS INDEX: 36.62 KG/M2 | WEIGHT: 199 LBS | DIASTOLIC BLOOD PRESSURE: 75 MMHG | HEART RATE: 100 BPM

## 2023-08-29 DIAGNOSIS — Z01.419 WELL WOMAN EXAM: Primary | ICD-10-CM

## 2023-08-29 PROCEDURE — 99395 PREV VISIT EST AGE 18-39: CPT | Performed by: STUDENT IN AN ORGANIZED HEALTH CARE EDUCATION/TRAINING PROGRAM

## 2023-08-29 RX ORDER — NORETHINDRONE ACETATE/ETHINYL ESTRADIOL AND FERROUS FUMARATE 1.5-30(21)
KIT ORAL
COMMUNITY
Start: 2023-07-18

## 2023-08-29 RX ORDER — BUPROPION HYDROCHLORIDE 300 MG/1
300 TABLET ORAL EVERY MORNING
Qty: 90 TABLET | Refills: 1 | Status: SHIPPED | OUTPATIENT
Start: 2023-08-29

## 2023-08-29 RX ORDER — ATOMOXETINE 40 MG/1
40 CAPSULE ORAL DAILY
Qty: 90 CAPSULE | Refills: 1 | Status: SHIPPED | OUTPATIENT
Start: 2023-08-29

## 2023-09-06 LAB
HPV DNA HIGH RISK: NEGATIVE
HPV GENOTYPE: NEGATIVE
HPV, GENOTYPE 18: NEGATIVE
PAP IMAGE GUIDED: NORMAL

## 2023-10-25 DIAGNOSIS — R00.0 SINUS TACHYCARDIA: ICD-10-CM

## 2023-10-25 RX ORDER — METOPROLOL SUCCINATE 25 MG/1
TABLET, EXTENDED RELEASE ORAL
Qty: 90 TABLET | Refills: 1 | Status: SHIPPED | OUTPATIENT
Start: 2023-10-25

## 2024-01-05 ENCOUNTER — OFFICE VISIT (OUTPATIENT)
Facility: CLINIC | Age: 34
End: 2024-01-05
Payer: COMMERCIAL

## 2024-01-05 VITALS
HEART RATE: 89 BPM | WEIGHT: 211.5 LBS | DIASTOLIC BLOOD PRESSURE: 73 MMHG | TEMPERATURE: 97.8 F | BODY MASS INDEX: 38.92 KG/M2 | RESPIRATION RATE: 18 BRPM | HEIGHT: 62 IN | SYSTOLIC BLOOD PRESSURE: 120 MMHG | OXYGEN SATURATION: 100 %

## 2024-01-05 DIAGNOSIS — J01.91 ACUTE RECURRENT SINUSITIS, UNSPECIFIED LOCATION: Primary | ICD-10-CM

## 2024-01-05 PROCEDURE — 99213 OFFICE O/P EST LOW 20 MIN: CPT | Performed by: STUDENT IN AN ORGANIZED HEALTH CARE EDUCATION/TRAINING PROGRAM

## 2024-01-05 RX ORDER — AZITHROMYCIN 250 MG/1
250 TABLET, FILM COATED ORAL SEE ADMIN INSTRUCTIONS
Qty: 6 TABLET | Refills: 0 | Status: SHIPPED | OUTPATIENT
Start: 2024-01-05 | End: 2024-01-10

## 2024-01-05 ASSESSMENT — PATIENT HEALTH QUESTIONNAIRE - PHQ9
SUM OF ALL RESPONSES TO PHQ QUESTIONS 1-9: 0
2. FEELING DOWN, DEPRESSED OR HOPELESS: 0
SUM OF ALL RESPONSES TO PHQ QUESTIONS 1-9: 0
SUM OF ALL RESPONSES TO PHQ9 QUESTIONS 1 & 2: 0
SUM OF ALL RESPONSES TO PHQ QUESTIONS 1-9: 0
6. FEELING BAD ABOUT YOURSELF - OR THAT YOU ARE A FAILURE OR HAVE LET YOURSELF OR YOUR FAMILY DOWN: 0
5. POOR APPETITE OR OVEREATING: 0
SUM OF ALL RESPONSES TO PHQ QUESTIONS 1-9: 0
8. MOVING OR SPEAKING SO SLOWLY THAT OTHER PEOPLE COULD HAVE NOTICED. OR THE OPPOSITE, BEING SO FIGETY OR RESTLESS THAT YOU HAVE BEEN MOVING AROUND A LOT MORE THAN USUAL: 0
10. IF YOU CHECKED OFF ANY PROBLEMS, HOW DIFFICULT HAVE THESE PROBLEMS MADE IT FOR YOU TO DO YOUR WORK, TAKE CARE OF THINGS AT HOME, OR GET ALONG WITH OTHER PEOPLE: 0
3. TROUBLE FALLING OR STAYING ASLEEP: 0
1. LITTLE INTEREST OR PLEASURE IN DOING THINGS: 0
4. FEELING TIRED OR HAVING LITTLE ENERGY: 0
7. TROUBLE CONCENTRATING ON THINGS, SUCH AS READING THE NEWSPAPER OR WATCHING TELEVISION: 0
9. THOUGHTS THAT YOU WOULD BE BETTER OFF DEAD, OR OF HURTING YOURSELF: 0

## 2024-01-05 NOTE — PROGRESS NOTES
Subjective:       Alicia Hall is a 33 y.o. female who presents for evaluation of chronic sinus problems. Symptoms include facial pressure. Patient has had symptoms of sinusitis with facial pressure, nasal congestion recurrent for about 1 month. Has gotten better than worse. Taking flonase and zyrtec without relief. No fever.  Review of Systems  Pertinent items are noted in HPI.      Objective:      /73 (Site: Right Upper Arm, Position: Sitting, Cuff Size: Large Adult)   Pulse 89   Temp 97.8 °F (36.6 °C) (Temporal)   Resp 18   Ht 1.575 m (5' 2\")   Wt 95.9 kg (211 lb 8 oz)   SpO2 100%   BMI 38.68 kg/m²   General appearance: alert, appears stated age, and cooperative  Lungs: clear to auscultation bilaterally  Heart: regular rate and rhythm, S1, S2 normal, no murmur, click, rub or gallop      Assessment:      Chronic bacterial sinusitis.      Plan:      Zithromax per medication orders.

## 2024-01-05 NOTE — PROGRESS NOTES
Alicia Hall presents today for   Chief Complaint   Patient presents with    Congestion     Patient reports of chest congestion, sore throat, cough, and now head congestion that started the middle of November       Is someone accompanying this pt? no    Is the patient using any DME equipment during OV? no    Health Maintenance reviewed and discussed and ordered per Provider.    Health Maintenance Due   Topic Date Due    Hepatitis B vaccine (1 of 3 - 3-dose series) Never done    Varicella vaccine (1 of 2 - 2-dose childhood series) Never done    HIV screen  Never done    Hepatitis C screen  Never done    DTaP/Tdap/Td vaccine (1 - Tdap) Never done    Flu vaccine (1) 08/01/2023    COVID-19 Vaccine (8 - 2023-24 season) 09/01/2023   .      Coordination of Care:  1. \"Have you been to the ER, urgent care clinic since your last visit?  Hospitalized since your last visit?\" No    2. \"Have you seen or consulted any other health care providers outside of the [facility] since your last visit?\" No    3. For patients aged 45-75: Has the patient had a colonoscopy? N/A based on age/sex    If the patient is female:    4. For patients aged 40-74: Has the patient had a mammogram within the past 2 years? N/A based on age/sex    5. For patients aged 21-65: Has the patient had a pap smear? Yes- No care gap present

## 2024-03-13 RX ORDER — BUPROPION HYDROCHLORIDE 300 MG/1
300 TABLET ORAL EVERY MORNING
Qty: 90 TABLET | Refills: 1 | Status: SHIPPED | OUTPATIENT
Start: 2024-03-13

## 2024-04-26 ENCOUNTER — OFFICE VISIT (OUTPATIENT)
Facility: CLINIC | Age: 34
End: 2024-04-26
Payer: COMMERCIAL

## 2024-04-26 VITALS
TEMPERATURE: 97.4 F | WEIGHT: 214.8 LBS | DIASTOLIC BLOOD PRESSURE: 79 MMHG | SYSTOLIC BLOOD PRESSURE: 120 MMHG | HEIGHT: 62 IN | HEART RATE: 122 BPM | BODY MASS INDEX: 39.53 KG/M2 | OXYGEN SATURATION: 99 %

## 2024-04-26 DIAGNOSIS — Z00.00 HEALTHCARE MAINTENANCE: ICD-10-CM

## 2024-04-26 DIAGNOSIS — R00.0 SINUS TACHYCARDIA: ICD-10-CM

## 2024-04-26 DIAGNOSIS — E66.09 CLASS 2 OBESITY DUE TO EXCESS CALORIES WITHOUT SERIOUS COMORBIDITY WITH BODY MASS INDEX (BMI) OF 37.0 TO 37.9 IN ADULT: ICD-10-CM

## 2024-04-26 DIAGNOSIS — N94.6 DYSMENORRHEA: Primary | ICD-10-CM

## 2024-04-26 PROCEDURE — 99214 OFFICE O/P EST MOD 30 MIN: CPT | Performed by: STUDENT IN AN ORGANIZED HEALTH CARE EDUCATION/TRAINING PROGRAM

## 2024-04-26 RX ORDER — NALTREXONE HYDROCHLORIDE 50 MG/1
25 TABLET, FILM COATED ORAL DAILY
Qty: 45 TABLET | Refills: 0 | Status: SHIPPED | OUTPATIENT
Start: 2024-04-26 | End: 2024-07-25

## 2024-04-26 RX ORDER — NORETHINDRONE ACETATE/ETHINYL ESTRADIOL AND FERROUS FUMARATE 1.5-30(21)
1 KIT ORAL DAILY
Qty: 3 PACKET | Refills: 3 | Status: SHIPPED | OUTPATIENT
Start: 2024-04-26

## 2024-04-26 SDOH — ECONOMIC STABILITY: INCOME INSECURITY: HOW HARD IS IT FOR YOU TO PAY FOR THE VERY BASICS LIKE FOOD, HOUSING, MEDICAL CARE, AND HEATING?: PATIENT DECLINED

## 2024-04-26 SDOH — ECONOMIC STABILITY: FOOD INSECURITY: WITHIN THE PAST 12 MONTHS, YOU WORRIED THAT YOUR FOOD WOULD RUN OUT BEFORE YOU GOT MONEY TO BUY MORE.: PATIENT DECLINED

## 2024-04-26 SDOH — ECONOMIC STABILITY: HOUSING INSECURITY
IN THE LAST 12 MONTHS, WAS THERE A TIME WHEN YOU DID NOT HAVE A STEADY PLACE TO SLEEP OR SLEPT IN A SHELTER (INCLUDING NOW)?: PATIENT DECLINED

## 2024-04-26 SDOH — ECONOMIC STABILITY: FOOD INSECURITY: WITHIN THE PAST 12 MONTHS, THE FOOD YOU BOUGHT JUST DIDN'T LAST AND YOU DIDN'T HAVE MONEY TO GET MORE.: PATIENT DECLINED

## 2024-04-26 NOTE — PROGRESS NOTES
SICK VISIT     Alicia Hall (: 1990) is a 34 y.o. female here for evaluation of the following chief concerns(s):  Other (Pt has concerns about medication changes, weight an birth control)       ASSESSMENT/PLAN:  1. Dysmenorrhea  -     MARTHA FE 1.5 1.5-30 MG-MCG tablet; Take 1 tablet by mouth daily, Disp-3 packet, R-3, DAWNormal  2. Class 2 obesity due to excess calories without serious comorbidity with body mass index (BMI) of 37.0 to 37.9 in adult  -     Thyroid Cascade Profile; Future  -     naltrexone (DEPADE) 50 MG tablet; Take 0.5 tablets by mouth daily, Disp-45 tablet, R-0Normal  -     External Referral To Nutrition Services  3. Healthcare maintenance  -     Hepatitis C Antibody; Future  -     HIV 1/2 Ag/Ab, 4TH Generation,W Rflx Confirm; Future  -     Hemoglobin A1C; Future  -     Lipid Panel; Future  -     Comprehensive Metabolic Panel; Future  -     CBC with Auto Differential; Future  4. Sinus tachycardia  -     [facility]    Orders Placed This Encounter   Procedures    Thyroid Cascade Profile     Standing Status:   Future     Standing Expiration Date:   2025    Hepatitis C Antibody     Standing Status:   Future     Standing Expiration Date:   2025    HIV 1/2 Ag/Ab, 4TH Generation,W Rflx Confirm     Standing Status:   Future     Standing Expiration Date:   2025    Hemoglobin A1C     Standing Status:   Future     Standing Expiration Date:   2025    Lipid Panel     Standing Status:   Future     Standing Expiration Date:   2025    Comprehensive Metabolic Panel     Standing Status:   Future     Standing Expiration Date:   2025    CBC with Auto Differential     Standing Status:   Future     Standing Expiration Date:   2025    [facility] ([facility])     Referral Priority:   Routine     Referral Type:   Eval and Treat     Referral Reason:   Specialty Services Required     Requested Specialty:

## 2024-04-26 NOTE — PROGRESS NOTES
Alicia Hall presents today for   Chief Complaint   Patient presents with    Other     Pt has concerns about medication changes, weight an birth control       Is someone accompanying this pt? no    Is the patient using any DME equipment during OV? no    Health Maintenance reviewed and discussed and ordered per Provider.    Health Maintenance Due   Topic Date Due    Hepatitis B vaccine (1 of 3 - 3-dose series) Never done    Varicella vaccine (1 of 2 - 2-dose childhood series) Never done    HIV screen  Never done    Hepatitis C screen  Never done    DTaP/Tdap/Td vaccine (1 - Tdap) Never done    COVID-19 Vaccine (8 - 2023-24 season) 09/01/2023   .      \"Have you been to the ER, urgent care clinic since your last visit?  Hospitalized since your last visit?\"    NO    “Have you seen or consulted any other health care providers outside of [facility] since your last visit?”    NO

## 2024-06-04 ENCOUNTER — TELEMEDICINE (OUTPATIENT)
Facility: CLINIC | Age: 34
End: 2024-06-04
Payer: COMMERCIAL

## 2024-06-04 DIAGNOSIS — R11.2 NAUSEA AND VOMITING, UNSPECIFIED VOMITING TYPE: Primary | ICD-10-CM

## 2024-06-04 DIAGNOSIS — E66.09 CLASS 2 OBESITY DUE TO EXCESS CALORIES WITHOUT SERIOUS COMORBIDITY WITH BODY MASS INDEX (BMI) OF 37.0 TO 37.9 IN ADULT: ICD-10-CM

## 2024-06-04 PROCEDURE — 99214 OFFICE O/P EST MOD 30 MIN: CPT | Performed by: STUDENT IN AN ORGANIZED HEALTH CARE EDUCATION/TRAINING PROGRAM

## 2024-06-04 RX ORDER — ONDANSETRON 4 MG/1
4 TABLET, FILM COATED ORAL DAILY PRN
Qty: 30 TABLET | Refills: 0 | Status: SHIPPED | OUTPATIENT
Start: 2024-06-04

## 2024-06-04 RX ORDER — OMEPRAZOLE 40 MG/1
40 CAPSULE, DELAYED RELEASE ORAL
Qty: 90 CAPSULE | Refills: 1 | Status: SHIPPED | OUTPATIENT
Start: 2024-06-04

## 2024-06-04 NOTE — PROGRESS NOTES
Alicia Hall, was evaluated through a synchronous (real-time) audio-video encounter. The patient (or guardian if applicable) is aware that this is a billable service, which includes applicable co-pays. This Virtual Visit was conducted with patient's (and/or legal guardian's) consent. Patient identification was verified, and a caregiver was present when appropriate.   The patient was located at Home: [address] [place] [address] [place] [id number]  Provider was located at Facility (Appt Dept): [address] [place] [address]  [place] [address]  Confirm you are appropriately licensed, registered, or certified to deliver care in the state where the patient is located as indicated above. If you are not or unsure, please re-schedule the visit: Yes, I confirm.     Alicia Hall (:  1990) is a Established patient, presenting virtually for evaluation of the following:    Assessment & Plan   Below is the assessment and plan developed based on review of pertinent history, physical exam, labs, studies, and medications.  1. Nausea and vomiting, unspecified vomiting type  -     ondansetron (ZOFRAN) 4 MG tablet; Take 1 tablet by mouth daily as needed for Nausea or Vomiting, Disp-30 tablet, R-0Normal  -     omeprazole (PRILOSEC) 40 MG delayed release capsule; Take 1 capsule by mouth every morning (before breakfast), Disp-90 capsule, R-1Normal  2. Class 2 obesity due to excess calories without serious comorbidity with body mass index (BMI) of 37.0 to 37.9 in adult    No follow-ups on file.       Trial of PPI daily, Zofran as needed   Return precautions   Great job with weight loss- continues to follow with nutrition    Subjective   HPI  Review of Systems     Patient presents for follow-up.  I prescribed her naltrexone about a month ago to makeshift Contrave for weight loss since she had already been on Wellbutrin.  She immediately had nausea and vomiting when she took naltrexone, only took 1 dose and then stop.  She is still having

## 2024-06-04 NOTE — PROGRESS NOTES
Alicia DEUTSCH Eugenerahel presents today for   Chief Complaint   Patient presents with    Nausea     VV- nausea and headaches       Health Maintenance reviewed and discussed and ordered per Provider.    Health Maintenance Due   Topic Date Due    Hepatitis B vaccine (1 of 3 - 3-dose series) Never done    Varicella vaccine (1 of 2 - 2-dose childhood series) Never done    HIV screen  Never done    Hepatitis C screen  Never done    DTaP/Tdap/Td vaccine (1 - Tdap) Never done    COVID-19 Vaccine (8 - 2023-24 season) 09/01/2023   .      \"Have you been to the ER, urgent care clinic since your last visit?  Hospitalized since your last visit?\"    NO    “Have you seen or consulted any other health care providers outside of [facility] since your last visit?”    NO

## 2024-06-20 DIAGNOSIS — R00.0 SINUS TACHYCARDIA: ICD-10-CM

## 2024-06-20 RX ORDER — METOPROLOL SUCCINATE 25 MG/1
TABLET, EXTENDED RELEASE ORAL
Qty: 90 TABLET | Refills: 1 | Status: SHIPPED | OUTPATIENT
Start: 2024-06-20

## 2024-07-08 RX ORDER — ATOMOXETINE 40 MG/1
40 CAPSULE ORAL DAILY
Qty: 90 CAPSULE | Refills: 0 | Status: SHIPPED | OUTPATIENT
Start: 2024-07-08

## 2024-09-18 RX ORDER — BUPROPION HYDROCHLORIDE 300 MG/1
300 TABLET ORAL EVERY MORNING
Qty: 90 TABLET | Refills: 1 | Status: SHIPPED | OUTPATIENT
Start: 2024-09-18

## 2024-11-25 ENCOUNTER — OFFICE VISIT (OUTPATIENT)
Age: 34
End: 2024-11-25

## 2024-11-25 VITALS
OXYGEN SATURATION: 97 % | SYSTOLIC BLOOD PRESSURE: 138 MMHG | BODY MASS INDEX: 40.48 KG/M2 | HEIGHT: 62 IN | WEIGHT: 220 LBS | DIASTOLIC BLOOD PRESSURE: 82 MMHG | HEART RATE: 102 BPM

## 2024-11-25 DIAGNOSIS — R00.0 SINUS TACHYCARDIA: ICD-10-CM

## 2024-11-25 DIAGNOSIS — R00.0 SINUS TACHYCARDIA: Primary | ICD-10-CM

## 2024-11-25 DIAGNOSIS — I47.20 VENTRICULAR TACHYCARDIA (HCC): ICD-10-CM

## 2024-11-25 NOTE — PROGRESS NOTES
\"HCT\", \"PHCT\", \"PLT\", \"MCV\"  No results found for: \"NA\", \"K\", \"CL\", \"CO2\", \"BUN\", \"CREATININE\", \"GLUCOSE\", \"CALCIUM\"         No data to display              No results found for: \"ALT\"  No results found for: \"LABA1C\"  No results found for: \"TSH\", \"TSHFT4\", \"TSHELE\", \"DQB6FHZ\", \"TSHHS\"    EK2024: Sinus tachycardia.  Normal NJ and QRS interval.  No specific ST changes of ischemia.    IMPRESSION & PLAN:  Alicia Hall  is 34 y.o. female with    Tachycardia:  Asymptomatic.  Heart rate towards end of the clinic was 86 bpm.  Calculated personally  Check TSH  Echo to rule out abnormal structure  Event monitor for 30-day  Eventual goal is to consider stopping metoprolol.  Currently taking metoprolol.  Hopefully with dietary modification, weight loss she will be able to achieve that goal    ADHD: Used to be on atomoxetine however stopped taking the medications couple months ago.  Defer to primary team    Obesity: Current weight is 220 pound with BMI of 40.  Importance of diet and exercise was discussed with patient.    This plan was discussed with patient who is in agreement.    Thank you for allowing me to participate in patient care. Please feel free to call me if you have any question or concern.     Fei Sterling MD  Please note: This document has been produced using voice recognition software. Unrecognized errors in transcription may be present.

## 2024-11-26 ENCOUNTER — HOSPITAL ENCOUNTER (OUTPATIENT)
Age: 34
Setting detail: SPECIMEN
Discharge: HOME | End: 2024-11-29

## 2024-11-26 LAB — SENTARA SPECIMEN COLLECTION: NORMAL

## 2024-11-26 PROCEDURE — 99001 SPECIMEN HANDLING PT-LAB: CPT

## 2024-11-27 LAB — HBA1C MFR BLD HPLC: 5.8 %

## 2024-12-04 PROBLEM — R73.03 PREDIABETES: Status: ACTIVE | Noted: 2024-12-04

## 2025-01-09 ENCOUNTER — HOSPITAL ENCOUNTER (OUTPATIENT)
Age: 35
Setting detail: SPECIMEN
Discharge: HOME | End: 2025-01-12

## 2025-01-09 ENCOUNTER — OFFICE VISIT (OUTPATIENT)
Facility: CLINIC | Age: 35
End: 2025-01-09
Payer: COMMERCIAL

## 2025-01-09 VITALS
RESPIRATION RATE: 18 BRPM | HEART RATE: 91 BPM | SYSTOLIC BLOOD PRESSURE: 137 MMHG | WEIGHT: 219.4 LBS | BODY MASS INDEX: 40.37 KG/M2 | OXYGEN SATURATION: 93 % | HEIGHT: 62 IN | DIASTOLIC BLOOD PRESSURE: 88 MMHG | TEMPERATURE: 97 F

## 2025-01-09 DIAGNOSIS — E66.812 CLASS 2 OBESITY DUE TO EXCESS CALORIES WITHOUT SERIOUS COMORBIDITY WITH BODY MASS INDEX (BMI) OF 37.0 TO 37.9 IN ADULT: ICD-10-CM

## 2025-01-09 DIAGNOSIS — F33.40 RECURRENT MAJOR DEPRESSIVE DISORDER, IN REMISSION (HCC): ICD-10-CM

## 2025-01-09 DIAGNOSIS — R73.03 PREDIABETES: Primary | ICD-10-CM

## 2025-01-09 DIAGNOSIS — E66.09 CLASS 2 OBESITY DUE TO EXCESS CALORIES WITHOUT SERIOUS COMORBIDITY WITH BODY MASS INDEX (BMI) OF 37.0 TO 37.9 IN ADULT: ICD-10-CM

## 2025-01-09 DIAGNOSIS — F90.9 ATTENTION DEFICIT HYPERACTIVITY DISORDER (ADHD), UNSPECIFIED ADHD TYPE: ICD-10-CM

## 2025-01-09 DIAGNOSIS — N94.6 DYSMENORRHEA: ICD-10-CM

## 2025-01-09 DIAGNOSIS — R94.6 ABNORMAL THYROID FUNCTION TEST: ICD-10-CM

## 2025-01-09 DIAGNOSIS — R73.03 PREDIABETES: ICD-10-CM

## 2025-01-09 LAB — SENTARA SPECIMEN COLLECTION: NORMAL

## 2025-01-09 PROCEDURE — 99001 SPECIMEN HANDLING PT-LAB: CPT

## 2025-01-09 PROCEDURE — 99214 OFFICE O/P EST MOD 30 MIN: CPT | Performed by: STUDENT IN AN ORGANIZED HEALTH CARE EDUCATION/TRAINING PROGRAM

## 2025-01-09 SDOH — ECONOMIC STABILITY: FOOD INSECURITY: WITHIN THE PAST 12 MONTHS, THE FOOD YOU BOUGHT JUST DIDN'T LAST AND YOU DIDN'T HAVE MONEY TO GET MORE.: PATIENT DECLINED

## 2025-01-09 SDOH — ECONOMIC STABILITY: FOOD INSECURITY: WITHIN THE PAST 12 MONTHS, YOU WORRIED THAT YOUR FOOD WOULD RUN OUT BEFORE YOU GOT MONEY TO BUY MORE.: PATIENT DECLINED

## 2025-01-09 ASSESSMENT — PATIENT HEALTH QUESTIONNAIRE - PHQ9
10. IF YOU CHECKED OFF ANY PROBLEMS, HOW DIFFICULT HAVE THESE PROBLEMS MADE IT FOR YOU TO DO YOUR WORK, TAKE CARE OF THINGS AT HOME, OR GET ALONG WITH OTHER PEOPLE: EXTREMELY DIFFICULT
3. TROUBLE FALLING OR STAYING ASLEEP: MORE THAN HALF THE DAYS
2. FEELING DOWN, DEPRESSED OR HOPELESS: SEVERAL DAYS
1. LITTLE INTEREST OR PLEASURE IN DOING THINGS: MORE THAN HALF THE DAYS
6. FEELING BAD ABOUT YOURSELF - OR THAT YOU ARE A FAILURE OR HAVE LET YOURSELF OR YOUR FAMILY DOWN: SEVERAL DAYS
SUM OF ALL RESPONSES TO PHQ QUESTIONS 1-9: 15
8. MOVING OR SPEAKING SO SLOWLY THAT OTHER PEOPLE COULD HAVE NOTICED. OR THE OPPOSITE, BEING SO FIGETY OR RESTLESS THAT YOU HAVE BEEN MOVING AROUND A LOT MORE THAN USUAL: NOT AT ALL
SUM OF ALL RESPONSES TO PHQ QUESTIONS 1-9: 15
SUM OF ALL RESPONSES TO PHQ QUESTIONS 1-9: 15
4. FEELING TIRED OR HAVING LITTLE ENERGY: NEARLY EVERY DAY
7. TROUBLE CONCENTRATING ON THINGS, SUCH AS READING THE NEWSPAPER OR WATCHING TELEVISION: NEARLY EVERY DAY
9. THOUGHTS THAT YOU WOULD BE BETTER OFF DEAD, OR OF HURTING YOURSELF: NOT AT ALL
SUM OF ALL RESPONSES TO PHQ QUESTIONS 1-9: 15
5. POOR APPETITE OR OVEREATING: NEARLY EVERY DAY
SUM OF ALL RESPONSES TO PHQ9 QUESTIONS 1 & 2: 3

## 2025-01-09 NOTE — PROGRESS NOTES
Alicia Hall presents today for   Chief Complaint   Patient presents with    3 Month Follow-Up    Discuss Labs    Discuss Medications       Is someone accompanying this pt? no    Is the patient using any DME equipment during OV? no    Health Maintenance Due   Topic Date Due    Varicella vaccine (1 of 2 - 13+ 2-dose series) Never done    Hepatitis B vaccine (1 of 3 - 19+ 3-dose series) Never done    DTaP/Tdap/Td vaccine (1 - Tdap) Never done    COVID-19 Vaccine (8 - 2023-24 season) 09/01/2024    Depression Monitoring  01/05/2025         \"Have you been to the ER, urgent care clinic since your last visit?  Hospitalized since your last visit?\"    NO    “Have you seen or consulted any other health care providers outside our system since your last visit?”    NO

## 2025-01-09 NOTE — PROGRESS NOTES
SICK VISIT     Alicia Hall (: 1990) is a 34 y.o. female here for evaluation of the following chief concerns(s):  3 Month Follow-Up, Discuss Labs, and Discuss Medications       ASSESSMENT/PLAN:  1. Prediabetes  -     Hemoglobin A1C; Future  2. Abnormal thyroid function test  -     Thyroid Peroxidase Antibody; Future  -     TSH; Future  -     T3; Future  -     T4; Future  3. Dysmenorrhea  4. Class 2 obesity due to excess calories without serious comorbidity with body mass index (BMI) of 37.0 to 37.9 in adult  -     Lipid Panel; Future  -     Comprehensive Metabolic Panel; Future  -     CBC with Auto Differential; Future  5. Attention deficit hyperactivity disorder (ADHD), unspecified ADHD type  6. Recurrent major depressive disorder, in remission (HCC)    Orders Placed This Encounter   Procedures    Hemoglobin A1C     Standing Status:   Future     Standing Expiration Date:   2026    Lipid Panel     Standing Status:   Future     Standing Expiration Date:   2026    Comprehensive Metabolic Panel     Standing Status:   Future     Standing Expiration Date:   2026    CBC with Auto Differential     Standing Status:   Future     Standing Expiration Date:   2026    Thyroid Peroxidase Antibody     Standing Status:   Future     Standing Expiration Date:   2026    TSH     Standing Status:   Future     Standing Expiration Date:   2026    T3     Standing Status:   Future     Standing Expiration Date:   2026    T4     Standing Status:   Future     Standing Expiration Date:   2026     Severe obesity, prediabetes-diet and exercise counseling.  She is following with nutrition which has been helpful. Knows she needs to start exercising. Check labs per above including thyroid which was subclinical last time. She is interested in weight loss medications but insurance does not cover    Sinus tachycardia - stable. Following with [facility], reviewed note. Has cardiac monitoring and echo

## 2025-01-13 DIAGNOSIS — R00.0 SINUS TACHYCARDIA: ICD-10-CM

## 2025-01-14 RX ORDER — METOPROLOL SUCCINATE 25 MG/1
TABLET, EXTENDED RELEASE ORAL
Qty: 90 TABLET | Refills: 1 | Status: SHIPPED | OUTPATIENT
Start: 2025-01-14

## 2025-01-15 LAB
A/G RATIO: 1.7 RATIO (ref 1.1–2.6)
ALBUMIN: 4.2 G/DL (ref 3.5–5)
ALP BLD-CCNC: 61 U/L (ref 25–115)
ALT SERPL-CCNC: 15 U/L (ref 5–40)
ANION GAP SERPL CALCULATED.3IONS-SCNC: 13 MMOL/L (ref 3–15)
AST SERPL-CCNC: 24 U/L (ref 10–37)
BILIRUB SERPL-MCNC: 0.2 MG/DL (ref 0.2–1.2)
BUN BLDV-MCNC: 7 MG/DL (ref 6–22)
CALCIUM SERPL-MCNC: 10 MG/DL (ref 8.4–10.5)
CHLORIDE BLD-SCNC: 106 MMOL/L (ref 98–110)
CHOLESTEROL, TOTAL: 163 MG/DL (ref 110–200)
CHOLESTEROL/HDL RATIO: 2.6 (ref 0–5)
CO2: 22 MMOL/L (ref 20–32)
CREAT SERPL-MCNC: 1.2 MG/DL (ref 0.5–1.2)
ESTIMATED AVERAGE GLUCOSE: 105 MG/DL (ref 91–123)
GFR, ESTIMATED: 58.6 ML/MIN/1.73 SQ.M.
GLOBULIN: 2.5 G/DL (ref 2–4)
GLUCOSE: 73 MG/DL (ref 70–99)
HBA1C MFR BLD: 5.3 % (ref 4.8–5.6)
HDLC SERPL-MCNC: 62 MG/DL
LDL CHOLESTEROL: 88 MG/DL (ref 50–99)
LDL/HDL RATIO: 1.4
NON-HDL CHOLESTEROL: 101 MG/DL
POTASSIUM SERPL-SCNC: 4.3 MMOL/L (ref 3.5–5.5)
SODIUM BLD-SCNC: 141 MMOL/L (ref 133–145)
T3 FREE: 3.3 PG/ML (ref 2.3–4.2)
T4 FREE: 1.2 NG/DL (ref 0.9–1.8)
THYROID PEROXIDASE (TPO) ABS: <1 IU/ML
TOTAL PROTEIN: 6.7 G/DL (ref 6.4–8.3)
TRIGL SERPL-MCNC: 64 MG/DL (ref 40–149)
TSH SERPL DL<=0.05 MIU/L-ACNC: 2.04 MCU/ML (ref 0.27–4.2)
VLDLC SERPL CALC-MCNC: 13 MG/DL (ref 8–30)

## 2025-02-10 ENCOUNTER — TELEMEDICINE (OUTPATIENT)
Facility: CLINIC | Age: 35
End: 2025-02-10
Payer: COMMERCIAL

## 2025-02-10 DIAGNOSIS — E66.812 CLASS 2 OBESITY DUE TO EXCESS CALORIES WITHOUT SERIOUS COMORBIDITY WITH BODY MASS INDEX (BMI) OF 37.0 TO 37.9 IN ADULT: ICD-10-CM

## 2025-02-10 DIAGNOSIS — E66.09 CLASS 2 OBESITY DUE TO EXCESS CALORIES WITHOUT SERIOUS COMORBIDITY WITH BODY MASS INDEX (BMI) OF 37.0 TO 37.9 IN ADULT: ICD-10-CM

## 2025-02-10 DIAGNOSIS — F33.40 RECURRENT MAJOR DEPRESSIVE DISORDER, IN REMISSION (HCC): Primary | ICD-10-CM

## 2025-02-10 PROCEDURE — 99214 OFFICE O/P EST MOD 30 MIN: CPT | Performed by: STUDENT IN AN ORGANIZED HEALTH CARE EDUCATION/TRAINING PROGRAM

## 2025-02-10 RX ORDER — TOPIRAMATE 25 MG/1
25 TABLET, FILM COATED ORAL NIGHTLY
Qty: 30 TABLET | Refills: 1 | Status: SHIPPED | OUTPATIENT
Start: 2025-02-10

## 2025-02-10 NOTE — ASSESSMENT & PLAN NOTE
Orders:    topiramate (TOPAMAX) 25 MG tablet; Take 1 tablet by mouth nightly  Depression-stable.  Continue Wellbutrin    Morbid obesity-still working on weight loss with diet and exercise, she is really struggling with hunger.  Unfortunately her insurance does not cover any weight loss medications.  Phentermine is relatively inexpensive, however I am hesitant to prescribe this given her underlying tachycardia that is being worked up by cardiology.  We discussed possibly using a little bit of Topamax for appetite suppression- discussed side effect profile with patient.  She would like to try it.  We discussed that this is off label technically for weight loss. It is part of Qsymia.

## 2025-02-10 NOTE — PROGRESS NOTES
Alicia Hall presents today for   Chief Complaint   Patient presents with    Follow-up     VV- 1M F/U       Health Maintenance reviewed and discussed and ordered per Provider.    Health Maintenance Due   Topic Date Due    Varicella vaccine (1 of 2 - 13+ 2-dose series) Never done    Hepatitis B vaccine (1 of 3 - 19+ 3-dose series) Never done    DTaP/Tdap/Td vaccine (1 - Tdap) Never done    COVID-19 Vaccine (8 - 2024-25 season) 09/01/2024   .      \"Have you been to the ER, urgent care clinic since your last visit?  Hospitalized since your last visit?\"    NO    “Have you seen or consulted any other health care providers outside our system since your last visit?”    NO             
    Subjective   HPI  Review of Systems     Patient manage for follow-up.  She is just recovering from norovirus.  Started eating solid food again today.  Her mood has been fairly stable, up and down-but manageable for the most part.  She likes the Wellbutrin at the dose that she has not.  With norovirus, her weight is down to 208.  She is trying to find a time to do exercise in her busy routine.  She is doing 16-hour intermittent fasting.  She is finding it really hard to resist her hunger drive.  Objective   Patient-Reported Vitals  No data recorded     Physical Exam             --Tonya Cantor MD

## 2025-03-28 DIAGNOSIS — N94.6 DYSMENORRHEA: ICD-10-CM

## 2025-03-28 RX ORDER — NORETHINDRONE ACETATE/ETHINYL ESTRADIOL AND FERROUS FUMARATE 1.5-30(21)
1 KIT ORAL DAILY
Qty: 3 PACKET | Refills: 1 | Status: SHIPPED | OUTPATIENT
Start: 2025-03-28

## 2025-04-15 RX ORDER — BUPROPION HYDROCHLORIDE 300 MG/1
300 TABLET ORAL EVERY MORNING
Qty: 90 TABLET | Refills: 1 | Status: SHIPPED | OUTPATIENT
Start: 2025-04-15

## 2025-05-25 DIAGNOSIS — E66.812 CLASS 2 OBESITY DUE TO EXCESS CALORIES WITHOUT SERIOUS COMORBIDITY WITH BODY MASS INDEX (BMI) OF 37.0 TO 37.9 IN ADULT: ICD-10-CM

## 2025-05-25 DIAGNOSIS — E66.09 CLASS 2 OBESITY DUE TO EXCESS CALORIES WITHOUT SERIOUS COMORBIDITY WITH BODY MASS INDEX (BMI) OF 37.0 TO 37.9 IN ADULT: ICD-10-CM

## 2025-05-27 DIAGNOSIS — N94.6 DYSMENORRHEA: ICD-10-CM

## 2025-05-27 RX ORDER — NORETHINDRONE ACETATE/ETHINYL ESTRADIOL AND FERROUS FUMARATE 1.5-30(21)
1 KIT ORAL DAILY
Qty: 3 PACKET | Refills: 1 | Status: SHIPPED | OUTPATIENT
Start: 2025-05-27 | End: 2025-05-29 | Stop reason: SDUPTHER

## 2025-05-28 ENCOUNTER — PATIENT MESSAGE (OUTPATIENT)
Facility: CLINIC | Age: 35
End: 2025-05-28

## 2025-05-28 DIAGNOSIS — N94.6 DYSMENORRHEA: ICD-10-CM

## 2025-05-28 RX ORDER — TOPIRAMATE 25 MG/1
25 TABLET, FILM COATED ORAL NIGHTLY
Qty: 30 TABLET | Refills: 0 | Status: SHIPPED | OUTPATIENT
Start: 2025-05-28

## 2025-05-29 RX ORDER — NORETHINDRONE ACETATE/ETHINYL ESTRADIOL AND FERROUS FUMARATE 1.5-30(21)
1 KIT ORAL DAILY
Qty: 3 PACKET | Refills: 1 | Status: SHIPPED | OUTPATIENT
Start: 2025-05-29

## 2025-07-01 DIAGNOSIS — E66.09 CLASS 2 OBESITY DUE TO EXCESS CALORIES WITHOUT SERIOUS COMORBIDITY WITH BODY MASS INDEX (BMI) OF 37.0 TO 37.9 IN ADULT: ICD-10-CM

## 2025-07-01 DIAGNOSIS — E66.812 CLASS 2 OBESITY DUE TO EXCESS CALORIES WITHOUT SERIOUS COMORBIDITY WITH BODY MASS INDEX (BMI) OF 37.0 TO 37.9 IN ADULT: ICD-10-CM

## 2025-07-01 RX ORDER — TOPIRAMATE 25 MG/1
25 TABLET, FILM COATED ORAL NIGHTLY
Qty: 30 TABLET | Refills: 0 | Status: SHIPPED | OUTPATIENT
Start: 2025-07-01

## 2025-07-14 DIAGNOSIS — R00.0 SINUS TACHYCARDIA: ICD-10-CM

## 2025-07-16 ENCOUNTER — TELEMEDICINE (OUTPATIENT)
Facility: CLINIC | Age: 35
End: 2025-07-16
Payer: COMMERCIAL

## 2025-07-16 DIAGNOSIS — J32.9 RECURRENT SINUSITIS: ICD-10-CM

## 2025-07-16 DIAGNOSIS — J32.9 CHRONIC SINUSITIS, UNSPECIFIED LOCATION: Primary | ICD-10-CM

## 2025-07-16 PROCEDURE — 99213 OFFICE O/P EST LOW 20 MIN: CPT | Performed by: STUDENT IN AN ORGANIZED HEALTH CARE EDUCATION/TRAINING PROGRAM

## 2025-07-16 RX ORDER — METOPROLOL SUCCINATE 25 MG/1
25 TABLET, EXTENDED RELEASE ORAL DAILY
Qty: 90 TABLET | Refills: 1 | Status: SHIPPED | OUTPATIENT
Start: 2025-07-16

## 2025-07-16 NOTE — PROGRESS NOTES
Alicia Hall, was evaluated through a synchronous (real-time) audio-video encounter. The patient (or guardian if applicable) is aware that this is a billable service, which includes applicable co-pays. This Virtual Visit was conducted with patient's (and/or legal guardian's) consent. Patient identification was verified, and a caregiver was present when appropriate.   The patient was located at Home: [address] [place] [address] [place] [address]  Provider was located at Facility (Appt Dept): [address] [place] [address]  [place] [address]  Confirm you are appropriately licensed, registered, or certified to deliver care in the state where the patient is located as indicated above. If you are not or unsure, please re-schedule the visit: Yes, I confirm.     Alicia Hall (:  1990) is a Established patient, presenting virtually for evaluation of the following:      Below is the assessment and plan developed based on review of pertinent history, physical exam, labs, studies, and medications.     Assessment & Plan  Chronic sinusitis, unspecified location       Orders:    External Referral To ENT    Recurrent sinusitis          Refer to ENT   Continue symptomatic relief for now. Restart Flonase. Return precautions.     No follow-ups on file.       Subjective   HPI  Review of Systems     Feels like she is having sinus infections at least once per month for the past year  Congestion, runny nose, PND  Uses Flonase on a regular basis   Most recently has had bilateral ear pain and congestion since Saturday. No fevers and chills.   She has been using Mucinex with minimal relief.   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       Objective   Patient-Reported Vitals  No data recorded     Physical Exam             --Tonya Cantor MD

## 2025-07-30 DIAGNOSIS — E66.09 CLASS 2 OBESITY DUE TO EXCESS CALORIES WITHOUT SERIOUS COMORBIDITY WITH BODY MASS INDEX (BMI) OF 37.0 TO 37.9 IN ADULT: ICD-10-CM

## 2025-07-30 DIAGNOSIS — E66.812 CLASS 2 OBESITY DUE TO EXCESS CALORIES WITHOUT SERIOUS COMORBIDITY WITH BODY MASS INDEX (BMI) OF 37.0 TO 37.9 IN ADULT: ICD-10-CM

## 2025-07-30 RX ORDER — TOPIRAMATE 25 MG/1
25 TABLET, FILM COATED ORAL NIGHTLY
Qty: 30 TABLET | Refills: 0 | Status: SHIPPED | OUTPATIENT
Start: 2025-07-30

## 2025-08-30 DIAGNOSIS — E66.812 CLASS 2 OBESITY DUE TO EXCESS CALORIES WITHOUT SERIOUS COMORBIDITY WITH BODY MASS INDEX (BMI) OF 37.0 TO 37.9 IN ADULT: ICD-10-CM

## 2025-08-30 DIAGNOSIS — E66.09 CLASS 2 OBESITY DUE TO EXCESS CALORIES WITHOUT SERIOUS COMORBIDITY WITH BODY MASS INDEX (BMI) OF 37.0 TO 37.9 IN ADULT: ICD-10-CM

## 2025-09-02 RX ORDER — TOPIRAMATE 25 MG/1
25 TABLET, FILM COATED ORAL NIGHTLY
Qty: 30 TABLET | Refills: 0 | Status: SHIPPED | OUTPATIENT
Start: 2025-09-02